# Patient Record
Sex: FEMALE | URBAN - METROPOLITAN AREA
[De-identification: names, ages, dates, MRNs, and addresses within clinical notes are randomized per-mention and may not be internally consistent; named-entity substitution may affect disease eponyms.]

---

## 2020-09-09 ENCOUNTER — APPOINTMENT (RX ONLY)
Dept: URBAN - METROPOLITAN AREA CLINIC 141 | Facility: CLINIC | Age: 55
Setting detail: DERMATOLOGY
End: 2020-09-09

## 2020-09-09 DIAGNOSIS — Z41.9 ENCOUNTER FOR PROCEDURE FOR PURPOSES OTHER THAN REMEDYING HEALTH STATE, UNSPECIFIED: ICD-10-CM

## 2020-09-09 PROCEDURE — ? MEDICAL CONSULTATION: PRODUCTS

## 2020-09-09 ASSESSMENT — LOCATION SIMPLE DESCRIPTION DERM: LOCATION SIMPLE: GLABELLA

## 2020-09-09 ASSESSMENT — LOCATION ZONE DERM: LOCATION ZONE: FACE

## 2020-09-09 ASSESSMENT — LOCATION DETAILED DESCRIPTION DERM: LOCATION DETAILED: GLABELLA

## 2020-09-09 NOTE — HPI: OTHER
Condition:: Melasma on face
Please Describe Your Condition:: Patient is interested products to treat melasma.

## 2020-10-12 ENCOUNTER — APPOINTMENT (RX ONLY)
Dept: URBAN - METROPOLITAN AREA CLINIC 141 | Facility: CLINIC | Age: 55
Setting detail: DERMATOLOGY
End: 2020-10-12

## 2020-10-12 DIAGNOSIS — L81.1 CHLOASMA: ICD-10-CM

## 2020-10-12 DIAGNOSIS — L98.8 OTHER SPECIFIED DISORDERS OF THE SKIN AND SUBCUTANEOUS TISSUE: ICD-10-CM

## 2020-10-12 PROCEDURE — ? COUNSELING

## 2020-10-12 PROCEDURE — ? IN-HOUSE DISPENSING PHARMACY

## 2020-10-12 PROCEDURE — ? OTHER

## 2020-10-12 PROCEDURE — 99213 OFFICE O/P EST LOW 20 MIN: CPT

## 2020-10-12 ASSESSMENT — LOCATION SIMPLE DESCRIPTION DERM
LOCATION SIMPLE: LEFT FOREHEAD
LOCATION SIMPLE: GLABELLA
LOCATION SIMPLE: LEFT CHEEK
LOCATION SIMPLE: RIGHT CHEEK
LOCATION SIMPLE: NOSE
LOCATION SIMPLE: RIGHT FOREHEAD

## 2020-10-12 ASSESSMENT — LOCATION DETAILED DESCRIPTION DERM
LOCATION DETAILED: NASAL SUPRATIP
LOCATION DETAILED: LEFT FOREHEAD
LOCATION DETAILED: RIGHT INFERIOR CENTRAL MALAR CHEEK
LOCATION DETAILED: LEFT INFERIOR CENTRAL MALAR CHEEK
LOCATION DETAILED: GLABELLA
LOCATION DETAILED: RIGHT FOREHEAD

## 2020-10-12 ASSESSMENT — LOCATION ZONE DERM
LOCATION ZONE: FACE
LOCATION ZONE: NOSE

## 2020-10-12 NOTE — PROCEDURE: IN-HOUSE DISPENSING PHARMACY
Product 1 Refills: 11
Product 61 Price/Unit (In Dollars): 0
Product 7 Unit Type: mg
Product 4 Application Directions: apply to affected area(s) once to twice daily as needed
Name Of Product 3: Rosacea Gel with Brimonidine (metronidazole/ivermectin/azelaic acid/brimonidine)
Product 3 Application Directions: apply to affected areas every morning
Name Of Product 2: Melasma Emulsion (hydroquinone/tretinoin/kojic acid)
Product 3 Amount/Unit (Numbers Only): 30
Product 1 Unit Type: tube(s)
Product 2 Refills: 2
Product 1 Amount/Unit (Numbers Only): 1
Render Product Pricing In Note: Yes
Name Of Product 5: Rosacea Combo (metronidazole/ivermectin)
Product 2 Application Directions: apply to affected area(s) for 3 months on, 1 month off, repeat as needed
Product 1 Application Directions: apply to lips twice a day as needed
Product 5 Application Directions: apply to affected area(s) daily
Name Of Product 1: Dr Judd Mcdonald
Detail Level: Zone
Name Of Product 4: Tacrolimus 0.1% Ointment

## 2020-10-12 NOTE — PROCEDURE: MIPS QUALITY
Detail Level: Zone
Quality 154 Part B: Falls: Risk Screening (Should Be Reported With Measure 155.): Patient screened for future fall risk; documentation of no falls in the past year or only one fall without injury in the past year
Quality 111:Pneumonia Vaccination Status For Older Adults: Pneumococcal Vaccination Previously Received
Quality 154 Part A: Falls: Risk Assessment (Should Be Reported With Measure 155.): Falls risk assessment completed and documented in the past 12 months.
Quality 155 (Denominator): Falls Plan Of Care: Falls plan of care documented
Quality 431: Preventive Care And Screening: Unhealthy Alcohol Use - Screening: Patient screened for unhealthy alcohol use using a single question and scores 2 or greater episodes per year and brief intervention occurred
Quality 47: Advance Care Plan: Advance Care Planning discussed and documented in the medical record; patient did not wish or was not able to name a surrogate decision maker or provide an advance care plan.
Quality 110: Preventive Care And Screening: Influenza Immunization: Influenza Immunization Administered during Influenza season
Quality 134: Screening For Clinical Depression And Follow-Up Plan: The patient was screened for depression and the screen was negative and no follow up required

## 2020-10-12 NOTE — PROCEDURE: OTHER
Detail Level: Detailed
Other (Free Text): Recommended the following dosing:\\nperiorbital 15 units, glabella 10 units, forehead 10 units\\n\\nPatient will schedule as desired.
Note Text (......Xxx Chief Complaint.): This diagnosis correlates with the

## 2020-11-10 ENCOUNTER — APPOINTMENT (RX ONLY)
Dept: URBAN - METROPOLITAN AREA CLINIC 141 | Facility: CLINIC | Age: 55
Setting detail: DERMATOLOGY
End: 2020-11-10

## 2020-11-10 DIAGNOSIS — L98.8 OTHER SPECIFIED DISORDERS OF THE SKIN AND SUBCUTANEOUS TISSUE: ICD-10-CM

## 2020-11-10 PROCEDURE — ? COUNSELING

## 2020-11-10 PROCEDURE — ? BOTOX (U OR CC)

## 2020-11-10 ASSESSMENT — LOCATION DETAILED DESCRIPTION DERM
LOCATION DETAILED: LEFT SUPERIOR LATERAL MALAR CHEEK
LOCATION DETAILED: RIGHT SUPERIOR CENTRAL MALAR CHEEK
LOCATION DETAILED: SUPERIOR MID FOREHEAD
LOCATION DETAILED: RIGHT INFERIOR TEMPLE
LOCATION DETAILED: RIGHT FOREHEAD
LOCATION DETAILED: RIGHT INFERIOR LATERAL FOREHEAD
LOCATION DETAILED: LEFT INFERIOR LATERAL FOREHEAD
LOCATION DETAILED: LEFT INFERIOR FOREHEAD
LOCATION DETAILED: RIGHT LATERAL EYEBROW
LOCATION DETAILED: LEFT MEDIAL EYEBROW
LOCATION DETAILED: RIGHT INFERIOR FOREHEAD
LOCATION DETAILED: RIGHT MEDIAL EYEBROW
LOCATION DETAILED: INFERIOR MID FOREHEAD
LOCATION DETAILED: LEFT LATERAL EYEBROW
LOCATION DETAILED: LEFT FOREHEAD
LOCATION DETAILED: LEFT SUPERIOR MEDIAL MALAR CHEEK
LOCATION DETAILED: RIGHT SUPERIOR LATERAL MALAR CHEEK
LOCATION DETAILED: LEFT SUPERIOR CENTRAL MALAR CHEEK

## 2020-11-10 ASSESSMENT — LOCATION SIMPLE DESCRIPTION DERM
LOCATION SIMPLE: RIGHT CHEEK
LOCATION SIMPLE: LEFT EYEBROW
LOCATION SIMPLE: LEFT FOREHEAD
LOCATION SIMPLE: RIGHT FOREHEAD
LOCATION SIMPLE: RIGHT EYEBROW
LOCATION SIMPLE: LEFT CHEEK
LOCATION SIMPLE: RIGHT TEMPLE
LOCATION SIMPLE: SUPERIOR FOREHEAD
LOCATION SIMPLE: INFERIOR FOREHEAD

## 2020-11-10 ASSESSMENT — LOCATION ZONE DERM: LOCATION ZONE: FACE

## 2020-11-10 NOTE — PROCEDURE: MIPS QUALITY
Quality 111:Pneumonia Vaccination Status For Older Adults: Pneumococcal Vaccination Previously Received
Quality 155 (Denominator): Falls Plan Of Care: Falls plan of care documented
Detail Level: Zone
Quality 154 Part B: Falls: Risk Screening (Should Be Reported With Measure 155.): Patient screened for future fall risk; documentation of no falls in the past year or only one fall without injury in the past year
Quality 134: Screening For Clinical Depression And Follow-Up Plan: The patient was screened for depression and the screen was negative and no follow up required
Quality 47: Advance Care Plan: Advance Care Planning discussed and documented in the medical record; patient did not wish or was not able to name a surrogate decision maker or provide an advance care plan.
Quality 110: Preventive Care And Screening: Influenza Immunization: Influenza Immunization Administered during Influenza season
Quality 154 Part A: Falls: Risk Assessment (Should Be Reported With Measure 155.): Falls risk assessment completed and documented in the past 12 months.
Quality 431: Preventive Care And Screening: Unhealthy Alcohol Use - Screening: Patient screened for unhealthy alcohol use using a single question and scores 2 or greater episodes per year and brief intervention occurred

## 2020-11-10 NOTE — PROCEDURE: BOTOX (U OR CC)
Forehead Units/Cc: 10
Additional Area 3 Units: 0
Quantity Per Injection Site (Units Or Cc): 1.25 U
Detail Level: Detailed
Dilution (U/0.1 Cc): 5
Reconstitution Date (Optional): 11/09
Consent: Written consent obtained. Risks include but not limited to lid/brow ptosis, bruising, swelling, diplopia, temporary effect, incomplete chemical denervation.
Additional Area 1 Location: upper and lower cutaneous lip along vermillion border
Quantity Per Injection Site (Units Or Cc): 2.5 U
Post-Care Instructions: Patient instructed to not lie down for 4 hours and limit physical activity for 24 hours. Patient instructed not to travel by airplane for 48 hours.
Additional Area 2 Units: 2.5
Including Pricing Information In The Note: No
Glabellar Complex Units: 7.5
Additional Area 2 Location: lateral brows
Periorbital Skin Units/Cc: 15
Expiration Date (Month Year): 06/2023
Lot #: [id number]
Price (Use Numbers Only, No Special Characters Or $): 367.72
Document As Units Or Cc?: units

## 2021-03-24 ENCOUNTER — APPOINTMENT (OUTPATIENT)
Age: 56
Setting detail: DERMATOLOGY
End: 2021-03-24

## 2021-03-24 DIAGNOSIS — L81.1 CHLOASMA: ICD-10-CM

## 2021-03-24 PROCEDURE — OTHER COUNSELING: OTHER

## 2021-03-24 PROCEDURE — OTHER TREATMENT REGIMEN: OTHER

## 2021-03-24 PROCEDURE — OTHER MIPS QUALITY: OTHER

## 2021-03-24 PROCEDURE — 99202 OFFICE O/P NEW SF 15 MIN: CPT

## 2021-03-24 PROCEDURE — OTHER IN-HOUSE DISPENSING PHARMACY: OTHER

## 2021-03-24 ASSESSMENT — LOCATION DETAILED DESCRIPTION DERM
LOCATION DETAILED: LEFT CENTRAL MALAR CHEEK
LOCATION DETAILED: RIGHT CENTRAL MALAR CHEEK
LOCATION DETAILED: RIGHT INFERIOR CENTRAL MALAR CHEEK
LOCATION DETAILED: LEFT INFERIOR CENTRAL MALAR CHEEK

## 2021-03-24 ASSESSMENT — LOCATION ZONE DERM: LOCATION ZONE: FACE

## 2021-03-24 ASSESSMENT — LOCATION SIMPLE DESCRIPTION DERM
LOCATION SIMPLE: RIGHT CHEEK
LOCATION SIMPLE: LEFT CHEEK

## 2021-03-24 NOTE — PROCEDURE: IN-HOUSE DISPENSING PHARMACY
Product 23 Price/Unit (In Dollars): 0
Product 1 Application Directions: Apply to face qhs
Product 71 Unit Type: mg
Product 2 Price/Unit (In Dollars): 60
Product 1 Price/Unit (In Dollars): 40
Product 1 Units Dispensed: 1
Detail Level: Zone
Name Of Product 1: Tretinoin
Name Of Product 2: Hydroquinone 4% emulsion
Render Product Pricing In Note: Yes
Product 1 Refills: 2

## 2021-05-24 ENCOUNTER — APPOINTMENT (RX ONLY)
Dept: URBAN - METROPOLITAN AREA CLINIC 141 | Facility: CLINIC | Age: 56
Setting detail: DERMATOLOGY
End: 2021-05-24

## 2021-05-24 DIAGNOSIS — L81.1 CHLOASMA: ICD-10-CM

## 2021-05-24 DIAGNOSIS — L98.8 OTHER SPECIFIED DISORDERS OF THE SKIN AND SUBCUTANEOUS TISSUE: ICD-10-CM

## 2021-05-24 PROCEDURE — 99212 OFFICE O/P EST SF 10 MIN: CPT

## 2021-05-24 PROCEDURE — ? PRESCRIPTION

## 2021-05-24 PROCEDURE — ? BOTOX (U OR CC)

## 2021-05-24 PROCEDURE — ? COUNSELING

## 2021-05-24 RX ORDER — H-QUINONE/TRETINOIN/HYDROCORT 8 %-0.025%
EMULSION (GRAM) TOPICAL
Qty: 1 | Refills: 6 | Status: ERX | COMMUNITY
Start: 2021-05-24

## 2021-05-24 RX ADMIN — Medication: at 00:00

## 2021-05-24 ASSESSMENT — LOCATION SIMPLE DESCRIPTION DERM
LOCATION SIMPLE: RIGHT CHEEK
LOCATION SIMPLE: INFERIOR FOREHEAD
LOCATION SIMPLE: SUPERIOR FOREHEAD
LOCATION SIMPLE: LEFT CHEEK
LOCATION SIMPLE: RIGHT EYEBROW
LOCATION SIMPLE: RIGHT TEMPLE
LOCATION SIMPLE: RIGHT FOREHEAD
LOCATION SIMPLE: LEFT FOREHEAD
LOCATION SIMPLE: LEFT EYEBROW

## 2021-05-24 ASSESSMENT — LOCATION DETAILED DESCRIPTION DERM
LOCATION DETAILED: RIGHT MEDIAL EYEBROW
LOCATION DETAILED: LEFT SUPERIOR LATERAL MALAR CHEEK
LOCATION DETAILED: RIGHT INFERIOR FOREHEAD
LOCATION DETAILED: RIGHT SUPERIOR LATERAL MALAR CHEEK
LOCATION DETAILED: LEFT SUPERIOR CENTRAL MALAR CHEEK
LOCATION DETAILED: LEFT MEDIAL EYEBROW
LOCATION DETAILED: RIGHT LATERAL EYEBROW
LOCATION DETAILED: LEFT FOREHEAD
LOCATION DETAILED: SUPERIOR MID FOREHEAD
LOCATION DETAILED: LEFT INFERIOR LATERAL FOREHEAD
LOCATION DETAILED: LEFT LATERAL EYEBROW
LOCATION DETAILED: INFERIOR MID FOREHEAD
LOCATION DETAILED: RIGHT SUPERIOR CENTRAL MALAR CHEEK
LOCATION DETAILED: RIGHT INFERIOR TEMPLE
LOCATION DETAILED: LEFT INFERIOR FOREHEAD
LOCATION DETAILED: LEFT SUPERIOR MEDIAL MALAR CHEEK
LOCATION DETAILED: RIGHT FOREHEAD
LOCATION DETAILED: RIGHT INFERIOR LATERAL FOREHEAD

## 2021-05-24 ASSESSMENT — LOCATION ZONE DERM: LOCATION ZONE: FACE

## 2021-05-24 NOTE — PROCEDURE: BOTOX (U OR CC)
Detail Level: Detailed
Anterior Platysmal Bands Units: 0
Quantity Per Injection Site (Units Or Cc): 1.25 U
Lot #: [id number]
Additional Area 1 Location: upper and lower cutaneous lip along vermillion border
Quantity Per Injection Site (Units Or Cc): 2.5 U
Post-Care Instructions: Patient instructed to not lie down for 4 hours and limit physical activity for 24 hours. Patient instructed not to travel by airplane for 48 hours.
Consent: Written consent obtained. Risks include but not limited to lid/brow ptosis, bruising, swelling, diplopia, temporary effect, incomplete chemical denervation.
Including Pricing Information In The Note: No
Price (Use Numbers Only, No Special Characters Or $): 367.13
Reconstitution Date (Optional): 5/24/21
Forehead Units/Cc: 10
Expiration Date (Month Year): 09/2023
Document As Units Or Cc?: units
Dilution (U/0.1 Cc): 5
Additional Area 2 Units: 2.5
Glabellar Complex Units: 7.5
Additional Area 2 Location: lateral brows
Periorbital Skin Units/Cc: 15

## 2021-05-24 NOTE — PROCEDURE: MIPS QUALITY
Quality 110: Preventive Care And Screening: Influenza Immunization: Influenza Immunization Administered during Influenza season
Quality 154 Part B: Falls: Risk Screening (Should Be Reported With Measure 155.): Patient screened for future fall risk; documentation of no falls in the past year or only one fall without injury in the past year
Quality 154 Part A: Falls: Risk Assessment (Should Be Reported With Measure 155.): Falls risk assessment completed and documented in the past 12 months.
Detail Level: Zone
Quality 431: Preventive Care And Screening: Unhealthy Alcohol Use - Screening: Patient screened for unhealthy alcohol use using a single question and scores 2 or greater episodes per year and brief intervention occurred
Quality 111:Pneumonia Vaccination Status For Older Adults: Pneumococcal Vaccination Previously Received
Quality 155 (Denominator): Falls Plan Of Care: Falls plan of care documented
Quality 47: Advance Care Plan: Advance Care Planning discussed and documented in the medical record; patient did not wish or was not able to name a surrogate decision maker or provide an advance care plan.
Quality 134: Screening For Clinical Depression And Follow-Up Plan: The patient was screened for depression and the screen was negative and no follow up required

## 2022-03-29 ENCOUNTER — APPOINTMENT (RX ONLY)
Dept: URBAN - METROPOLITAN AREA CLINIC 141 | Facility: CLINIC | Age: 57
Setting detail: DERMATOLOGY
End: 2022-03-29

## 2022-03-29 DIAGNOSIS — L98.8 OTHER SPECIFIED DISORDERS OF THE SKIN AND SUBCUTANEOUS TISSUE: ICD-10-CM

## 2022-03-29 PROCEDURE — ? BOTOX (U OR CC)

## 2022-03-29 ASSESSMENT — LOCATION DETAILED DESCRIPTION DERM
LOCATION DETAILED: RIGHT LATERAL FOREHEAD
LOCATION DETAILED: LEFT CENTRAL EYEBROW
LOCATION DETAILED: LEFT SUPERIOR FOREHEAD
LOCATION DETAILED: LEFT MEDIAL EYEBROW
LOCATION DETAILED: RIGHT SUPERIOR FOREHEAD
LOCATION DETAILED: LEFT LATERAL FOREHEAD
LOCATION DETAILED: LEFT SUPERIOR CENTRAL MALAR CHEEK
LOCATION DETAILED: RIGHT INFERIOR TEMPLE
LOCATION DETAILED: RIGHT LATERAL EYEBROW
LOCATION DETAILED: RIGHT MEDIAL EYEBROW
LOCATION DETAILED: LEFT LATERAL EYEBROW
LOCATION DETAILED: LEFT LATERAL CANTHUS
LOCATION DETAILED: RIGHT SUPERIOR CENTRAL MALAR CHEEK
LOCATION DETAILED: SUPERIOR MID FOREHEAD
LOCATION DETAILED: RIGHT CENTRAL EYEBROW

## 2022-03-29 ASSESSMENT — LOCATION SIMPLE DESCRIPTION DERM
LOCATION SIMPLE: SUPERIOR FOREHEAD
LOCATION SIMPLE: RIGHT EYEBROW
LOCATION SIMPLE: LEFT FOREHEAD
LOCATION SIMPLE: RIGHT TEMPLE
LOCATION SIMPLE: RIGHT CHEEK
LOCATION SIMPLE: RIGHT FOREHEAD
LOCATION SIMPLE: LEFT EYELID
LOCATION SIMPLE: LEFT EYEBROW
LOCATION SIMPLE: LEFT CHEEK

## 2022-03-29 ASSESSMENT — LOCATION ZONE DERM
LOCATION ZONE: FACE
LOCATION ZONE: EYELID

## 2022-03-29 NOTE — PROCEDURE: BOTOX (U OR CC)
Masseter Units: 0
Expiration Date (Month Year): 05/24
Glabellar Complex Units: 8
Quantity Per Injection Site (Units Or Cc): 2 U
Lot #: [id number]
Periorbital Skin Units/Cc: 16
Document As Units Or Cc?: units
Post-Care Instructions: Patient instructed to not lie down for 4 hours and limit physical activity for 24 hours. Patient instructed not to travel by airplane for 48 hours.
Dilution (U/0.1 Cc): 4
Including Pricing Information In The Note: No
Price (Use Numbers Only, No Special Characters Or $): 432
Detail Level: Detailed
Additional Area 1 Location: upper and lower cutaneous lip along vermillion border
Consent: Written consent obtained. Risks include but not limited to lid/brow ptosis, bruising, swelling, diplopia, temporary effect, incomplete chemical denervation.
Forehead Units/Cc: 10

## 2022-03-29 NOTE — PROCEDURE: MIPS QUALITY
Detail Level: Zone
Quality 431: Preventive Care And Screening: Unhealthy Alcohol Use - Screening: Patient screened for unhealthy alcohol use using a single question and scores 2 or greater episodes per year and brief intervention occurred
Quality 111:Pneumonia Vaccination Status For Older Adults: Pneumococcal Vaccination Previously Received
Quality 47: Advance Care Plan: Advance Care Planning discussed and documented in the medical record; patient did not wish or was not able to name a surrogate decision maker or provide an advance care plan.
Quality 154 Part A: Falls: Risk Assessment (Should Be Reported With Measure 155.): Falls risk assessment completed and documented in the past 12 months.
Quality 110: Preventive Care And Screening: Influenza Immunization: Influenza Immunization Administered during Influenza season
Quality 154 Part B: Falls: Risk Screening (Should Be Reported With Measure 155.): Patient screened for future fall risk; documentation of no falls in the past year or only one fall without injury in the past year
Quality 134: Screening For Clinical Depression And Follow-Up Plan: The patient was screened for depression and the screen was negative and no follow up required
Quality 155 (Denominator): Falls Plan Of Care: Falls plan of care documented

## 2022-05-12 ENCOUNTER — APPOINTMENT (RX ONLY)
Dept: URBAN - METROPOLITAN AREA CLINIC 176 | Facility: CLINIC | Age: 57
Setting detail: DERMATOLOGY
End: 2022-05-12

## 2022-05-12 DIAGNOSIS — L81.1 CHLOASMA: ICD-10-CM

## 2022-05-12 DIAGNOSIS — Z41.9 ENCOUNTER FOR PROCEDURE FOR PURPOSES OTHER THAN REMEDYING HEALTH STATE, UNSPECIFIED: ICD-10-CM

## 2022-05-12 PROCEDURE — ? BOTOX

## 2022-05-12 PROCEDURE — ? COSMETIC CONSULTATION - MELASMA

## 2022-05-12 ASSESSMENT — LOCATION DETAILED DESCRIPTION DERM
LOCATION DETAILED: RIGHT INFERIOR MEDIAL MALAR CHEEK
LOCATION DETAILED: LEFT LOWER CUTANEOUS LIP
LOCATION DETAILED: RIGHT INFERIOR MEDIAL FOREHEAD
LOCATION DETAILED: LEFT CENTRAL MALAR CHEEK
LOCATION DETAILED: RIGHT INFERIOR TEMPLE

## 2022-05-12 ASSESSMENT — LOCATION SIMPLE DESCRIPTION DERM
LOCATION SIMPLE: RIGHT CHEEK
LOCATION SIMPLE: RIGHT FOREHEAD
LOCATION SIMPLE: RIGHT TEMPLE
LOCATION SIMPLE: LEFT CHEEK
LOCATION SIMPLE: LEFT LIP

## 2022-05-12 ASSESSMENT — LOCATION ZONE DERM
LOCATION ZONE: FACE
LOCATION ZONE: LIP
LOCATION ZONE: FACE

## 2022-05-12 NOTE — PROCEDURE: BOTOX
Show Additional Area 1: Yes
Additional Area 6 Units: 0
Expiration Date (Month Year): 12/2023
Consent: Written consent obtained. Risks include but not limited to lid/brow ptosis, bruising, swelling, diplopia, temporary effect, incomplete chemical denervation.
Additional Area 2 Location: [facility]
Show Mentalis Units: No
Additional Area 1 Location: Perioral
Lot #: [id number]
Post-Care Instructions: Patient instructed to not lie down for 4 hours and limit physical activity for 24 hours. Patient instructed not to travel by airplane for 48 hours.
Price (Use Numbers Only, No Special Characters Or $): 120
Detail Level: Detailed
Dilution (U/0.1 Cc): 5
Additional Area 3 Location: Mental
Periorbital Skin Units: 10

## 2022-08-10 ENCOUNTER — APPOINTMENT (RX ONLY)
Dept: URBAN - METROPOLITAN AREA CLINIC 176 | Facility: CLINIC | Age: 57
Setting detail: DERMATOLOGY
End: 2022-08-10

## 2022-08-10 DIAGNOSIS — L81.1 CHLOASMA: ICD-10-CM

## 2022-08-10 PROCEDURE — ? COSMETIC CONSULTATION - MELASMA

## 2022-08-10 ASSESSMENT — LOCATION SIMPLE DESCRIPTION DERM
LOCATION SIMPLE: LEFT CHEEK
LOCATION SIMPLE: RIGHT CHEEK
LOCATION SIMPLE: LEFT FOREHEAD

## 2022-08-10 ASSESSMENT — LOCATION DETAILED DESCRIPTION DERM
LOCATION DETAILED: RIGHT INFERIOR CENTRAL MALAR CHEEK
LOCATION DETAILED: LEFT MEDIAL MALAR CHEEK
LOCATION DETAILED: LEFT INFERIOR MEDIAL FOREHEAD

## 2022-08-10 ASSESSMENT — LOCATION ZONE DERM: LOCATION ZONE: FACE

## 2022-10-31 ENCOUNTER — OFFICE VISIT (OUTPATIENT)
Dept: URBAN - METROPOLITAN AREA CLINIC 44 | Facility: CLINIC | Age: 57
End: 2022-10-31
Payer: COMMERCIAL

## 2022-10-31 DIAGNOSIS — H43.393 OTHER VITREOUS OPACITIES, BILATERAL: ICD-10-CM

## 2022-10-31 DIAGNOSIS — H25.813 COMBINED FORMS OF AGE-RELATED CATARACT, BILATERAL: ICD-10-CM

## 2022-10-31 DIAGNOSIS — D31.32 BENIGN NEOPLASM OF LEFT CHOROID: Primary | ICD-10-CM

## 2022-10-31 DIAGNOSIS — H04.123 TEAR FILM INSUFFICIENCY OF BILATERAL LACRIMAL GLANDS: ICD-10-CM

## 2022-10-31 PROCEDURE — 92004 COMPRE OPH EXAM NEW PT 1/>: CPT | Performed by: OPTOMETRIST

## 2022-10-31 ASSESSMENT — KERATOMETRY
OS: 43.50
OD: 43.63

## 2022-10-31 ASSESSMENT — VISUAL ACUITY
OS: 20/20
OD: 20/25

## 2022-10-31 ASSESSMENT — INTRAOCULAR PRESSURE
OS: 19
OD: 19

## 2022-10-31 NOTE — IMPRESSION/PLAN
Impression: Combined forms of age-related cataract, bilateral: H25.813. OD> OS  Patient asymptomatic and comfortable with current level of vision. Plan: PLAN: Continue to observe condition. RTC 12 months for complete exam complete + OCT (Mac) to reaccessed cataracts. RTC sooner if patient symptoms become worse.

## 2022-10-31 NOTE — IMPRESSION/PLAN
Impression: Benign neoplasm of left choroid: D31.32. Low risk characteristics. Plan: Discussed findings. Observe.  RTC 12 months for complete + OPTOS

## 2022-10-31 NOTE — IMPRESSION/PLAN
Impression: Tear film insufficiency of bilateral lacrimal glands: H04.123. Clinical evaluation shows mild DED signs. Patient reports no or occasional symptoms not interfering with daily activities. Plan: PLAN: Recommend Lipid based tears to be used 4X daily. Observe condition and RTC if symptom's worsen.

## 2022-11-09 ENCOUNTER — APPOINTMENT (RX ONLY)
Dept: URBAN - METROPOLITAN AREA CLINIC 176 | Facility: CLINIC | Age: 57
Setting detail: DERMATOLOGY
End: 2022-11-09

## 2022-11-09 DIAGNOSIS — L81.1 CHLOASMA: ICD-10-CM

## 2022-11-09 PROCEDURE — ? COSMETIC CONSULTATION: SKIN CARE PRODUCTS AND SERVICES

## 2022-11-09 ASSESSMENT — LOCATION SIMPLE DESCRIPTION DERM
LOCATION SIMPLE: RIGHT CHEEK
LOCATION SIMPLE: LEFT CHEEK
LOCATION SIMPLE: CHIN
LOCATION SIMPLE: LEFT FOREHEAD

## 2022-11-09 ASSESSMENT — LOCATION DETAILED DESCRIPTION DERM
LOCATION DETAILED: LEFT MEDIAL MALAR CHEEK
LOCATION DETAILED: LEFT CHIN
LOCATION DETAILED: LEFT MEDIAL FOREHEAD
LOCATION DETAILED: RIGHT CENTRAL MALAR CHEEK

## 2022-11-09 ASSESSMENT — LOCATION ZONE DERM: LOCATION ZONE: FACE

## 2023-04-03 ENCOUNTER — APPOINTMENT (RX ONLY)
Dept: URBAN - METROPOLITAN AREA CLINIC 176 | Facility: CLINIC | Age: 58
Setting detail: DERMATOLOGY
End: 2023-04-03

## 2023-04-03 DIAGNOSIS — Z41.9 ENCOUNTER FOR PROCEDURE FOR PURPOSES OTHER THAN REMEDYING HEALTH STATE, UNSPECIFIED: ICD-10-CM

## 2023-04-03 PROCEDURE — ? COSMETIC CONSULTATION: SKIN CARE PRODUCTS AND SERVICES

## 2023-04-03 ASSESSMENT — LOCATION DETAILED DESCRIPTION DERM
LOCATION DETAILED: RIGHT INFERIOR CENTRAL MALAR CHEEK
LOCATION DETAILED: INFERIOR MID FOREHEAD
LOCATION DETAILED: RIGHT LOWER CUTANEOUS LIP
LOCATION DETAILED: LEFT MEDIAL MALAR CHEEK

## 2023-04-03 ASSESSMENT — LOCATION ZONE DERM
LOCATION ZONE: LIP
LOCATION ZONE: FACE

## 2023-04-03 ASSESSMENT — LOCATION SIMPLE DESCRIPTION DERM
LOCATION SIMPLE: LEFT CHEEK
LOCATION SIMPLE: INFERIOR FOREHEAD
LOCATION SIMPLE: RIGHT CHEEK
LOCATION SIMPLE: RIGHT LIP

## 2023-05-08 ENCOUNTER — APPOINTMENT (RX ONLY)
Dept: URBAN - METROPOLITAN AREA CLINIC 176 | Facility: CLINIC | Age: 58
Setting detail: DERMATOLOGY
End: 2023-05-08

## 2023-05-08 DIAGNOSIS — Z41.9 ENCOUNTER FOR PROCEDURE FOR PURPOSES OTHER THAN REMEDYING HEALTH STATE, UNSPECIFIED: ICD-10-CM

## 2023-05-08 PROCEDURE — ? PRESCRIPTION

## 2023-05-08 PROCEDURE — ? COSMETIC CONSULTATION: FILLERS

## 2023-05-08 PROCEDURE — ? BOTOX

## 2023-05-08 RX ORDER — VALACYCLOVIR HYDROCHLORIDE 1 G/1
TABLET, FILM COATED ORAL
Qty: 10 | Refills: 0 | Status: ERX | COMMUNITY
Start: 2023-05-08

## 2023-05-08 RX ADMIN — VALACYCLOVIR HYDROCHLORIDE 1: 1 TABLET, FILM COATED ORAL at 00:00

## 2023-05-08 NOTE — PROCEDURE: BOTOX
R Brow Units: 0
Dilution (U/0.1 Cc): 5
Detail Level: Detailed
Glabellar Complex Units: 15
Show Mentalis Units: No
Show Glabellar Units: Yes
Additional Area 3 Location: Mental
Expiration Date (Month Year): 10/2025
Additional Area 1 Location: Perioral
Price (Use Numbers Only, No Special Characters Or $): 405
Additional Area 2 Location: [facility]
Incrementing Botox Units: By 0.5 Units
Consent: Written consent obtained. Risks include but not limited to lid/brow ptosis, bruising, swelling, diplopia, temporary effect, incomplete chemical denervation.
Post-Care Instructions: Patient instructed to not lie down for 4 hours and limit physical activity for 24 hours. Patient instructed not to travel by airplane for 48 hours.
Lot #: [id number]

## 2023-05-19 ENCOUNTER — APPOINTMENT (RX ONLY)
Dept: URBAN - METROPOLITAN AREA CLINIC 176 | Facility: CLINIC | Age: 58
Setting detail: DERMATOLOGY
End: 2023-05-19

## 2023-05-19 DIAGNOSIS — Z41.9 ENCOUNTER FOR PROCEDURE FOR PURPOSES OTHER THAN REMEDYING HEALTH STATE, UNSPECIFIED: ICD-10-CM

## 2023-05-19 PROCEDURE — ? VIVACE

## 2023-05-19 ASSESSMENT — LOCATION SIMPLE DESCRIPTION DERM
LOCATION SIMPLE: LEFT CHEEK
LOCATION SIMPLE: RIGHT CHEEK
LOCATION SIMPLE: CHIN
LOCATION SIMPLE: LEFT FOREHEAD

## 2023-05-19 ASSESSMENT — LOCATION DETAILED DESCRIPTION DERM
LOCATION DETAILED: RIGHT INFERIOR CENTRAL MALAR CHEEK
LOCATION DETAILED: RIGHT CHIN
LOCATION DETAILED: LEFT INFERIOR CENTRAL MALAR CHEEK
LOCATION DETAILED: LEFT INFERIOR MEDIAL FOREHEAD

## 2023-05-19 ASSESSMENT — LOCATION ZONE DERM: LOCATION ZONE: FACE

## 2023-05-19 NOTE — PROCEDURE: VIVACE
Location #3: Cheeks
Depth In Mm: 1.4
Location #2: Lip/Chin
Pain Scale: 0
Depth In Mm: 0.1
Consent: Written consent obtained, risks reviewed including but not limited to pain, scarring, infection and incomplete improvement.  Patient understands the procedure is cosmetic in nature and will require out of pocket payment.
Location #1: Forehead/Nose/Orbital
Depth In Mm: 1.8
Price (Use Numbers Only, No Special Characters Or $): 670.00
Detail Level: Zone
Post-Care Instructions: After the procedure, take precautions agains sun exposure. Do not apply sunscreen for 12 hours after the procedure. Do not apply make-up for 12 hours after the procedure. Avoid alcohol based toners for 10-14 days. After 2-3 days patients can return to their regular skin regimen.
Rf (Optional): 4/800
Treatment Number (Optional): 1
Pre-Procedure Text: After consent was obtained the treatment areas were treated using the above parameters.

## 2023-07-07 ENCOUNTER — APPOINTMENT (RX ONLY)
Dept: URBAN - METROPOLITAN AREA CLINIC 176 | Facility: CLINIC | Age: 58
Setting detail: DERMATOLOGY
End: 2023-07-07

## 2023-07-07 DIAGNOSIS — Z41.9 ENCOUNTER FOR PROCEDURE FOR PURPOSES OTHER THAN REMEDYING HEALTH STATE, UNSPECIFIED: ICD-10-CM

## 2023-07-07 PROCEDURE — ? VIVACE

## 2023-07-07 ASSESSMENT — LOCATION DETAILED DESCRIPTION DERM
LOCATION DETAILED: RIGHT INFERIOR MEDIAL FOREHEAD
LOCATION DETAILED: LEFT CENTRAL MALAR CHEEK
LOCATION DETAILED: RIGHT CENTRAL MALAR CHEEK
LOCATION DETAILED: RIGHT CHIN

## 2023-07-07 ASSESSMENT — LOCATION SIMPLE DESCRIPTION DERM
LOCATION SIMPLE: RIGHT CHEEK
LOCATION SIMPLE: CHIN
LOCATION SIMPLE: LEFT CHEEK
LOCATION SIMPLE: RIGHT FOREHEAD

## 2023-07-07 ASSESSMENT — LOCATION ZONE DERM: LOCATION ZONE: FACE

## 2023-07-07 NOTE — HPI: COSMETIC (LASER RESURFACING)
Have You Had Laser Resurfacing Before?: has had previous treatments
When Was Your Last Laser Resurfacing Treatment?: 05/19/23

## 2023-07-07 NOTE — PROCEDURE: VIVACE
Pre-Procedure Text: After consent was obtained the treatment areas were treated using the above parameters.
Pain Scale: 0
Depth In Mm: 1.4
Depth In Mm: 1
Rf (Optional): 4/800
Consent: Written consent obtained, risks reviewed including but not limited to pain, scarring, infection and incomplete improvement.  Patient understands the procedure is cosmetic in nature and will require out of pocket payment.
Price (Use Numbers Only, No Special Characters Or $): 670.00
Post-Care Instructions: After the procedure, take precautions agains sun exposure. Do not apply sunscreen for 12 hours after the procedure. Do not apply make-up for 12 hours after the procedure. Avoid alcohol based toners for 10-14 days. After 2-3 days patients can return to their regular skin regimen.
Treatment Number (Optional): 2
Depth In Mm: 0.1
Location #3: Cheeks
Location #2: Lip/Chin
Detail Level: Zone
Location #1: Forehead/Nose/Orbital

## 2023-08-18 ENCOUNTER — APPOINTMENT (RX ONLY)
Dept: URBAN - METROPOLITAN AREA CLINIC 176 | Facility: CLINIC | Age: 58
Setting detail: DERMATOLOGY
End: 2023-08-18

## 2023-08-18 DIAGNOSIS — Z41.9 ENCOUNTER FOR PROCEDURE FOR PURPOSES OTHER THAN REMEDYING HEALTH STATE, UNSPECIFIED: ICD-10-CM

## 2023-08-18 PROCEDURE — ? VIVACE

## 2023-08-18 ASSESSMENT — LOCATION DETAILED DESCRIPTION DERM
LOCATION DETAILED: RIGHT INFERIOR CENTRAL MALAR CHEEK
LOCATION DETAILED: INFERIOR MID FOREHEAD
LOCATION DETAILED: RIGHT CHIN
LOCATION DETAILED: LEFT MEDIAL MALAR CHEEK

## 2023-08-18 ASSESSMENT — LOCATION SIMPLE DESCRIPTION DERM
LOCATION SIMPLE: CHIN
LOCATION SIMPLE: RIGHT CHEEK
LOCATION SIMPLE: LEFT CHEEK
LOCATION SIMPLE: INFERIOR FOREHEAD

## 2023-08-18 ASSESSMENT — LOCATION ZONE DERM: LOCATION ZONE: FACE

## 2023-08-18 NOTE — PROCEDURE: VIVACE
Rf (Optional): 5/500
Rf (Optional): 4/800
Location #3: Cheeks
Price (Use Numbers Only, No Special Characters Or $): 670.00
Consent: Written consent obtained, risks reviewed including but not limited to pain, scarring, infection and incomplete improvement.  Patient understands the procedure is cosmetic in nature and will require out of pocket payment.
Detail Level: Zone
Depth In Mm: 1.4
Depth In Mm: 2
Post-Care Instructions: After the procedure, take precautions agains sun exposure. Do not apply sunscreen for 12 hours after the procedure. Do not apply make-up for 12 hours after the procedure. Avoid alcohol based toners for 10-14 days. After 2-3 days patients can return to their regular skin regimen.
Treatment Number (Optional): 3
Pain Scale: 0
Location #2: Lip/Chin
Location #1: Forehead/Nose/Orbital
Depth In Mm: 0.1
Depth In Mm: 1
Pre-Procedure Text: After consent was obtained the treatment areas were treated using the above parameters.

## 2023-09-05 ENCOUNTER — APPOINTMENT (RX ONLY)
Dept: URBAN - METROPOLITAN AREA CLINIC 176 | Facility: CLINIC | Age: 58
Setting detail: DERMATOLOGY
End: 2023-09-05

## 2023-09-05 DIAGNOSIS — Z41.9 ENCOUNTER FOR PROCEDURE FOR PURPOSES OTHER THAN REMEDYING HEALTH STATE, UNSPECIFIED: ICD-10-CM

## 2023-09-05 PROCEDURE — ? VOLBELLA INJECTION

## 2023-09-05 PROCEDURE — ? JUVEDERM ULTRA PLUS XC INJECTION

## 2023-09-05 PROCEDURE — ? BOTOX

## 2023-09-05 NOTE — PROCEDURE: BOTOX
R Brow Units: 0
Dilution (U/0.1 Cc): 5
Detail Level: Detailed
Show Mentalis Units: No
Show Glabellar Units: Yes
Additional Area 3 Location: Mental
Expiration Date (Month Year): 10/2025
Additional Area 1 Location: Perioral
Periorbital Skin Units: 15
Price (Use Numbers Only, No Special Characters Or $): 220
Additional Area 2 Location: [facility]
Incrementing Botox Units: By 0.5 Units
Consent: Written consent obtained. Risks include but not limited to lid/brow ptosis, bruising, swelling, diplopia, temporary effect, incomplete chemical denervation.
Post-Care Instructions: Patient instructed to not lie down for 4 hours and limit physical activity for 24 hours. Patient instructed not to travel by airplane for 48 hours.
Lot #: [id number]

## 2023-09-05 NOTE — PROCEDURE: VOLBELLA INJECTION
Map Statment: See Attach Map for Complete Details
Post-Care Instructions: Patient instructed to apply ice to reduce swelling.
Tear Troughs Filler  Volume In Cc: 0
Anesthesia Volume In Cc: 0.5
Detail Level: Detailed
Include Cannula Information In Note?: Yes
Consent: Written consent obtained. Risks include but not limited to bruising, beading, irregular texture, ulceration, infection, allergic reaction, scar formation, incomplete augmentation, temporary nature, procedural pain.
Filler: Juvederm Volbella XC
Include Cannula Length?: 1.5 inch
Expiration Date (Month Year): 09/06/2024
Anesthesia Type: 1% lidocaine with epinephrine
Lot #: [id number]
Number Of Syringes (Required For Inventory): 1
Include Cannula Size?: 25G
Procedural Text: The filler was administered to the treatment areas noted above.
Additional Anesthesia Volume In Cc: 6
Use Map Statement For Sites (Optional): No

## 2023-09-05 NOTE — PROCEDURE: JUVEDERM ULTRA PLUS XC INJECTION
Detail Level: Detailed
Brows Filler  Volume In Cc: 0
Post-Care Instructions: Patient instructed to apply ice to reduce swelling.
Include Cannula Size?: 25G
Lot #: [id number]
Consent: Written consent obtained. Risks include but not limited to bruising, beading, irregular texture, ulceration, infection, allergic reaction, scar formation, incomplete augmentation, temporary nature, procedural pain.
Marionette Lines Filler  Volume In Cc: 1.6
Procedural Text: The filler was administered to the treatment areas noted above.
Filler: Juvederm Ultra Plus XC
Expiration Date (Month Year): 2024-01-30
Map Statment: See Attach Map for Complete Details
Include Cannula Information In Note?: Yes
Include Cannula Length?: 1.5 inch
Price (Use Numbers Only, No Special Characters Or $): 4633
Use Map Statement For Sites (Optional): No
Number Of Syringes (Required For Inventory): 2
Additional Area 1 Volume In Cc: 0.4
Additional Area 1 Location: chin

## 2023-10-11 ENCOUNTER — APPOINTMENT (RX ONLY)
Dept: URBAN - METROPOLITAN AREA CLINIC 176 | Facility: CLINIC | Age: 58
Setting detail: DERMATOLOGY
End: 2023-10-11

## 2023-10-11 DIAGNOSIS — Z41.9 ENCOUNTER FOR PROCEDURE FOR PURPOSES OTHER THAN REMEDYING HEALTH STATE, UNSPECIFIED: ICD-10-CM

## 2023-10-11 PROCEDURE — ? HYDRAFACIAL

## 2023-10-11 ASSESSMENT — LOCATION SIMPLE DESCRIPTION DERM
LOCATION SIMPLE: LEFT FOREHEAD
LOCATION SIMPLE: RIGHT CHEEK
LOCATION SIMPLE: LEFT CHEEK
LOCATION SIMPLE: CHIN

## 2023-10-11 ASSESSMENT — LOCATION DETAILED DESCRIPTION DERM
LOCATION DETAILED: RIGHT INFERIOR CENTRAL MALAR CHEEK
LOCATION DETAILED: RIGHT CHIN
LOCATION DETAILED: LEFT INFERIOR MEDIAL FOREHEAD
LOCATION DETAILED: LEFT CENTRAL MALAR CHEEK

## 2023-10-11 ASSESSMENT — LOCATION ZONE DERM: LOCATION ZONE: FACE

## 2023-10-11 NOTE — PROCEDURE: HYDRAFACIAL
Tip: Hydropeel Tip, Blue
Number Of Passes: 0
Procedure: Extraction
Tip: Hydropeel Tip, Clear
Treatment Number: 1
Solution: GlySal 7.5%
Vacuum Pressure High Setting (Will Not Render If Set To 0): 14
Procedure: Fusion
Price (Use Numbers Only, No Special Characters Or $): 230.00
Procedure: Boost
Procedure: Exfoliation
Tip: Hydropeel Tip, Teal
Solution: Beta-HD
Tip Override
Procedure: Extend and Protect
Solution Override
Procedure: Peel
Indication: anti-aging
Vacuum Pressure High Setting (Will Not Render If Set To 0): 22
Solution: Activ-4
Consent: Written consent obtained, risks reviewed including but not limited to crusting, scabbing, blistering, scarring, darker or lighter pigmentary change, bruising, and/or incomplete response.
Vacuum Pressure High Setting (Will Not Render If Set To 0): 16
Post-Care Instructions: I reviewed with the patient in detail post-care instructions. Patient should stay away from the sun and wear sun protection until treated areas are fully healed.
Vacuum Pressure High Setting (Will Not Render If Set To 0): 14
Location: face

## 2023-10-30 ENCOUNTER — OFFICE VISIT (OUTPATIENT)
Dept: URBAN - METROPOLITAN AREA CLINIC 44 | Facility: CLINIC | Age: 58
End: 2023-10-30
Payer: COMMERCIAL

## 2023-10-30 DIAGNOSIS — H25.813 COMBINED FORMS OF AGE-RELATED CATARACT, BILATERAL: ICD-10-CM

## 2023-10-30 DIAGNOSIS — H40.013 OPEN ANGLE WITH BORDERLINE FINDINGS, LOW RISK, BILATERAL: Primary | ICD-10-CM

## 2023-10-30 DIAGNOSIS — H04.123 TEAR FILM INSUFFICIENCY OF BILATERAL LACRIMAL GLANDS: ICD-10-CM

## 2023-10-30 DIAGNOSIS — D31.32 BENIGN NEOPLASM OF LEFT CHOROID: ICD-10-CM

## 2023-10-30 DIAGNOSIS — H43.393 OTHER VITREOUS OPACITIES, BILATERAL: ICD-10-CM

## 2023-10-30 PROCEDURE — 92014 COMPRE OPH EXAM EST PT 1/>: CPT | Performed by: OPTOMETRIST

## 2023-10-30 PROCEDURE — 92133 CPTRZD OPH DX IMG PST SGM ON: CPT | Performed by: OPTOMETRIST

## 2023-10-30 ASSESSMENT — INTRAOCULAR PRESSURE
OD: 20
OS: 23
OS: 20
OD: 27

## 2023-10-30 ASSESSMENT — KERATOMETRY
OS: 43.25
OD: 43.38

## 2023-10-30 ASSESSMENT — VISUAL ACUITY
OD: 20/30
OS: 20/20

## 2023-11-13 ENCOUNTER — APPOINTMENT (RX ONLY)
Dept: URBAN - METROPOLITAN AREA CLINIC 176 | Facility: CLINIC | Age: 58
Setting detail: DERMATOLOGY
End: 2023-11-13

## 2023-11-13 DIAGNOSIS — Z41.9 ENCOUNTER FOR PROCEDURE FOR PURPOSES OTHER THAN REMEDYING HEALTH STATE, UNSPECIFIED: ICD-10-CM

## 2023-11-13 PROCEDURE — ? BOTOX

## 2023-11-13 PROCEDURE — ? JUVEDERM VOLUX XC INJECTION

## 2023-11-13 NOTE — PROCEDURE: JUVEDERM VOLUX XC INJECTION
Detail Level: Detailed
Additional Area 4 Volume In Cc: 0
Expiration Date (Month Year): 2024-10-04
Map Statment: See Attach Map for Complete Details
Anesthesia Volume In Cc: 0.5
Filler: Juvederm Volux XC
Additional Area 1 Location: prejowl sulcus
Anesthesia Type: 1% lidocaine with epinephrine
Additional Anesthesia Volume In Cc: 6
Procedural Text: The filler was administered to the treatment areas noted above.
Consent: Written consent obtained. Risks include but not limited to bruising, beading, irregular texture, ulceration, infection, allergic reaction, scar formation, incomplete augmentation, temporary nature, procedural pain.
Post-Care Instructions: Patient instructed to apply ice to reduce swelling.
Include Cannula Information In Note?: No
Lot #: [id number]
Number Of Syringes (Required For Inventory): 1

## 2023-11-13 NOTE — PROCEDURE: BOTOX
Additional Area 3 Units: 0
Show Additional Area 1: Yes
Show Mentalis Units: No
Dilution (U/0.1 Cc): 4
Consent: Written consent obtained. Risks include but not limited to lid/brow ptosis, bruising, swelling, diplopia, temporary effect, incomplete chemical denervation.
Detail Level: Detailed
Additional Area 1 Location: Perioral
Forehead Units: 5
Post-Care Instructions: Patient instructed to not lie down for 4 hours and limit physical activity for 24 hours. Patient instructed not to travel by airplane for 48 hours.
Glabellar Complex Units: 10
Additional Area 4 Location: Riri/platysma
Additional Area 4 Units: 20
Lot #: [id number]
Expiration Date (Month Year): 4/2026
Additional Area 3 Location: Mental
Incrementing Botox Units: By 0.5 Units
Price (Use Numbers Only, No Special Characters Or $): 948
Additional Area 2 Location: [facility]
Periorbital Skin Units: 15

## 2023-11-29 ENCOUNTER — APPOINTMENT (RX ONLY)
Dept: URBAN - METROPOLITAN AREA CLINIC 176 | Facility: CLINIC | Age: 58
Setting detail: DERMATOLOGY
End: 2023-11-29

## 2023-11-29 DIAGNOSIS — Z41.9 ENCOUNTER FOR PROCEDURE FOR PURPOSES OTHER THAN REMEDYING HEALTH STATE, UNSPECIFIED: ICD-10-CM

## 2023-11-29 PROCEDURE — ? HYDRAFACIAL

## 2023-11-29 ASSESSMENT — LOCATION ZONE DERM
LOCATION ZONE: LIP
LOCATION ZONE: FACE

## 2023-11-29 ASSESSMENT — LOCATION DETAILED DESCRIPTION DERM
LOCATION DETAILED: RIGHT LOWER CUTANEOUS LIP
LOCATION DETAILED: RIGHT INFERIOR CENTRAL MALAR CHEEK
LOCATION DETAILED: LEFT CENTRAL MALAR CHEEK
LOCATION DETAILED: RIGHT INFERIOR MEDIAL FOREHEAD

## 2023-11-29 ASSESSMENT — LOCATION SIMPLE DESCRIPTION DERM
LOCATION SIMPLE: RIGHT LIP
LOCATION SIMPLE: LEFT CHEEK
LOCATION SIMPLE: RIGHT CHEEK
LOCATION SIMPLE: RIGHT FOREHEAD

## 2023-11-29 NOTE — PROCEDURE: HYDRAFACIAL
Vacuum Pressure High Setting (Will Not Render If Set To 0): 14
Indication: anti-aging
Vacuum Pressure Low Setting (Will Not Render If Set To 0): 0
Procedure: Fusion
Vacuum Pressure High Setting (Will Not Render If Set To 0): 22
Procedure: Exfoliation
Solution Override
Procedure: Extend and Protect
Tip: Hydropeel Tip, Teal
Solution: Activ-4
Vacuum Pressure High Setting (Will Not Render If Set To 0): 16
Location: face
Procedure: Peel
Tip: Hydropeel Tip, Clear
Procedure: Extraction
Consent: Written consent obtained, risks reviewed including but not limited to crusting, scabbing, blistering, scarring, darker or lighter pigmentary change, bruising, and/or incomplete response.
Treatment Number: 1
Procedure: Boost
Solution: GlySal 7.5%
Post-Care Instructions: I reviewed with the patient in detail post-care instructions. Patient should stay away from the sun and wear sun protection until treated areas are fully healed.
Vacuum Pressure High Setting (Will Not Render If Set To 0): 14
Tip: Hydropeel Tip, Blue
Solution: Beta-HD
Price (Use Numbers Only, No Special Characters Or $): 230.00
Tip Override

## 2024-01-22 ENCOUNTER — APPOINTMENT (RX ONLY)
Dept: URBAN - METROPOLITAN AREA CLINIC 176 | Facility: CLINIC | Age: 59
Setting detail: DERMATOLOGY
End: 2024-01-22

## 2024-01-22 DIAGNOSIS — Z41.9 ENCOUNTER FOR PROCEDURE FOR PURPOSES OTHER THAN REMEDYING HEALTH STATE, UNSPECIFIED: ICD-10-CM

## 2024-01-22 PROCEDURE — ? HYDRAFACIAL

## 2024-01-22 ASSESSMENT — LOCATION SIMPLE DESCRIPTION DERM
LOCATION SIMPLE: LEFT CHEEK
LOCATION SIMPLE: CHIN
LOCATION SIMPLE: INFERIOR FOREHEAD
LOCATION SIMPLE: RIGHT CHEEK

## 2024-01-22 ASSESSMENT — LOCATION DETAILED DESCRIPTION DERM
LOCATION DETAILED: LEFT CENTRAL MALAR CHEEK
LOCATION DETAILED: RIGHT CHIN
LOCATION DETAILED: RIGHT CENTRAL MALAR CHEEK
LOCATION DETAILED: INFERIOR MID FOREHEAD

## 2024-01-22 ASSESSMENT — LOCATION ZONE DERM: LOCATION ZONE: FACE

## 2024-01-22 NOTE — PROCEDURE: HYDRAFACIAL
Number Of Passes Step 5: 0
Procedure: Exfoliation
Solution Override
Vacuum Pressure High Setting (Will Not Render If Set To 0): 22
Location: face
Tip Override
Solution: Activ-4
Tip: Hydropeel Tip, Teal
Procedure: Extraction
Consent: Written consent obtained, risks reviewed including but not limited to crusting, scabbing, blistering, scarring, darker or lighter pigmentary change, bruising, and/or incomplete response.
Procedure: Extend and Protect
Post-Care Instructions: I reviewed with the patient in detail post-care instructions. Patient should stay away from the sun and wear sun protection until treated areas are fully healed.
Procedure: Peel
Tip: Hydropeel Tip, Clear
Vacuum Pressure High Setting (Will Not Render If Set To 0): 14
Treatment Number: 1
Vacuum Pressure High Setting (Will Not Render If Set To 0): 16
Price (Use Numbers Only, No Special Characters Or $): 285.00
Tip: Hydropeel Tip, Blue
Procedure: Fusion
Solution: GlySal 7.5%
Indication: anti-aging
Solution: Beta-HD
Procedure: Boost

## 2024-02-13 ENCOUNTER — APPOINTMENT (RX ONLY)
Dept: URBAN - METROPOLITAN AREA CLINIC 176 | Facility: CLINIC | Age: 59
Setting detail: DERMATOLOGY
End: 2024-02-13

## 2024-02-13 DIAGNOSIS — Z41.9 ENCOUNTER FOR PROCEDURE FOR PURPOSES OTHER THAN REMEDYING HEALTH STATE, UNSPECIFIED: ICD-10-CM

## 2024-02-13 PROCEDURE — ? BOTOX

## 2024-02-13 PROCEDURE — ? ADDITIONAL NOTES

## 2024-02-13 NOTE — PROCEDURE: ADDITIONAL NOTES
Additional Notes: Informed patient that she is starting to get volume loss in her cheeks and suggested we should consider it. Patient aware that her right side is worse than her left side. Patient aware of the B2G1 filler promo. \\nReassured patient that we have made some progress but the “jowling” is still present.\\n\\nInformed patient that one syringe for each cheeks and if any extra can plan to treat with the NLF.
Detail Level: Detailed
Render Risk Assessment In Note?: yes

## 2024-02-13 NOTE — PROCEDURE: BOTOX
Additional Area 3 Units: 0
Show Additional Area 1: Yes
Show Mentalis Units: No
Dilution (U/0.1 Cc): 4
Consent: Written consent obtained. Risks include but not limited to lid/brow ptosis, bruising, swelling, diplopia, temporary effect, incomplete chemical denervation.
Detail Level: Detailed
Additional Area 1 Location: Perioral
Forehead Units: 5
Post-Care Instructions: Patient instructed to not lie down for 4 hours and limit physical activity for 24 hours. Patient instructed not to travel by airplane for 48 hours.
Glabellar Complex Units: 15
Additional Area 4 Location: Riri/platysma
Lot #: [id number]
Expiration Date (Month Year): 4/2026
Additional Area 3 Location: Mental
Incrementing Botox Units: By 0.5 Units
Price (Use Numbers Only, No Special Characters Or $): 673
Additional Area 2 Location: [facility]
Periorbital Skin Units: 20

## 2024-04-01 ENCOUNTER — APPOINTMENT (RX ONLY)
Dept: URBAN - METROPOLITAN AREA CLINIC 176 | Facility: CLINIC | Age: 59
Setting detail: DERMATOLOGY
End: 2024-04-01

## 2024-04-01 DIAGNOSIS — Z41.9 ENCOUNTER FOR PROCEDURE FOR PURPOSES OTHER THAN REMEDYING HEALTH STATE, UNSPECIFIED: ICD-10-CM

## 2024-04-01 PROCEDURE — ? HYDRAFACIAL

## 2024-04-01 ASSESSMENT — LOCATION DETAILED DESCRIPTION DERM
LOCATION DETAILED: RIGHT INFERIOR MEDIAL FOREHEAD
LOCATION DETAILED: RIGHT LOWER CUTANEOUS LIP
LOCATION DETAILED: RIGHT INFERIOR CENTRAL MALAR CHEEK
LOCATION DETAILED: LEFT CENTRAL MALAR CHEEK

## 2024-04-01 ASSESSMENT — LOCATION SIMPLE DESCRIPTION DERM
LOCATION SIMPLE: RIGHT CHEEK
LOCATION SIMPLE: RIGHT LIP
LOCATION SIMPLE: RIGHT FOREHEAD
LOCATION SIMPLE: LEFT CHEEK

## 2024-04-01 ASSESSMENT — LOCATION ZONE DERM
LOCATION ZONE: FACE
LOCATION ZONE: LIP

## 2024-04-01 NOTE — PROCEDURE: HYDRAFACIAL
Treatment Number: 1
Vacuum Pressure Low Setting (Will Not Render If Set To 0): 0
Price (Use Numbers Only, No Special Characters Or $): 250.00
Tip: Hydropeel Tip, Teal
Solution Override
Tip: Hydropeel Tip, Blue
Vacuum Pressure High Setting (Will Not Render If Set To 0): 22
Consent: Written consent obtained, risks reviewed including but not limited to crusting, scabbing, blistering, scarring, darker or lighter pigmentary change, bruising, and/or incomplete response.
Vacuum Pressure High Setting (Will Not Render If Set To 0): 16
Procedure: Exfoliation
Indication: anti-aging
Procedure: Extraction
Post-Care Instructions: I reviewed with the patient in detail post-care instructions. Patient should stay away from the sun and wear sun protection until treated areas are fully healed.
Procedure: Peel
Tip: Hydropeel Tip, Clear
Tip Override
Solution: GlySal 7.5%
Solution: Activ-4
Location: face
Solution: Beta-HD
Vacuum Pressure High Setting (Will Not Render If Set To 0): 14
Procedure: Extend and Protect
Procedure: Boost
Procedure: Fusion

## 2024-05-28 ENCOUNTER — APPOINTMENT (RX ONLY)
Dept: URBAN - METROPOLITAN AREA CLINIC 176 | Facility: CLINIC | Age: 59
Setting detail: DERMATOLOGY
End: 2024-05-28

## 2024-05-28 DIAGNOSIS — Z41.9 ENCOUNTER FOR PROCEDURE FOR PURPOSES OTHER THAN REMEDYING HEALTH STATE, UNSPECIFIED: ICD-10-CM

## 2024-05-28 PROCEDURE — ? HYDRAFACIAL

## 2024-05-28 ASSESSMENT — LOCATION ZONE DERM: LOCATION ZONE: FACE

## 2024-05-28 ASSESSMENT — LOCATION SIMPLE DESCRIPTION DERM: LOCATION SIMPLE: SUPERIOR FOREHEAD

## 2024-05-28 ASSESSMENT — LOCATION DETAILED DESCRIPTION DERM: LOCATION DETAILED: SUPERIOR MID FOREHEAD

## 2024-05-28 NOTE — PROCEDURE: HYDRAFACIAL
Price (Use Numbers Only, No Special Characters Or $): 814
Number Of Passes Step 3: 1
Solution Override
Procedure: Extraction
Vacuum Pressure Low Setting (Will Not Render If Set To 0): 12
Tip: Hydropeel Tip, Clear
Procedure: Extend and Protect
Comments: Patient here for HF sign. + dermaplane.  Has facials regularly with Deirdre.  Skin is in very goof health.  Does have Melasma but seems to have it under control.  Did have some sun this past weekend which she said is not normal.  \\n\\nHF sign. + dermaplane:\\nCleansed\\nPrep\\nDermaplane\\nTreat\\nExtarctions\\nTreat\\nIsdin SPF untinted
Indication: anti-aging
Procedure: Boost
Vacuum Pressure High Setting (Will Not Render If Set To 0): 0
Solution: Beta-HD
Number Of Passes Step 1: 2
Consent: Written consent obtained, risks reviewed including but not limited to crusting, scabbing, blistering, scarring, darker or lighter pigmentary change, bruising, and/or incomplete response.
Tip: Hydropeel Tip, Teal
Post-Care Instructions: I reviewed with the patient in detail post-care instructions. Patient should stay away from the sun and wear sun protection until treated areas are fully healed.
Procedure: Exfoliation
Procedure: Peel
Procedure: Fusion
Location: face
Tip: Hydropeel Tip, Blue
Vacuum Pressure High Setting (Will Not Render If Set To 0): 14
Tip Override
Solution: Activ-4

## 2024-07-09 ENCOUNTER — OFFICE VISIT (OUTPATIENT)
Dept: URBAN - METROPOLITAN AREA CLINIC 44 | Facility: CLINIC | Age: 59
End: 2024-07-09
Payer: COMMERCIAL

## 2024-07-09 DIAGNOSIS — D31.32 BENIGN NEOPLASM OF LEFT CHOROID: Primary | ICD-10-CM

## 2024-07-09 DIAGNOSIS — H25.813 COMBINED FORMS OF AGE-RELATED CATARACT, BILATERAL: ICD-10-CM

## 2024-07-09 DIAGNOSIS — H40.013 OPEN ANGLE WITH BORDERLINE FINDINGS, LOW RISK, BILATERAL: ICD-10-CM

## 2024-07-09 DIAGNOSIS — H04.123 TEAR FILM INSUFFICIENCY OF BILATERAL LACRIMAL GLANDS: ICD-10-CM

## 2024-07-09 PROCEDURE — 92014 COMPRE OPH EXAM EST PT 1/>: CPT | Performed by: OPTOMETRIST

## 2024-07-09 PROCEDURE — 92133 CPTRZD OPH DX IMG PST SGM ON: CPT | Performed by: OPTOMETRIST

## 2024-07-09 ASSESSMENT — VISUAL ACUITY
OS: 20/20
OD: 20/25

## 2024-07-09 ASSESSMENT — INTRAOCULAR PRESSURE
OD: 20
OS: 20

## 2024-07-09 ASSESSMENT — KERATOMETRY
OS: 43.38
OD: 43.63

## 2024-08-06 ENCOUNTER — APPOINTMENT (RX ONLY)
Dept: URBAN - METROPOLITAN AREA CLINIC 176 | Facility: CLINIC | Age: 59
Setting detail: DERMATOLOGY
End: 2024-08-06

## 2024-08-06 DIAGNOSIS — Z41.9 ENCOUNTER FOR PROCEDURE FOR PURPOSES OTHER THAN REMEDYING HEALTH STATE, UNSPECIFIED: ICD-10-CM

## 2024-08-06 PROCEDURE — ? BOTOX

## 2024-08-06 NOTE — PROCEDURE: BOTOX
Additional Area 3 Units: 0
Show Additional Area 1: Yes
Show Mentalis Units: No
Dilution (U/0.1 Cc): 4
Consent: Written consent obtained. Risks include but not limited to lid/brow ptosis, bruising, swelling, diplopia, temporary effect, incomplete chemical denervation.
Detail Level: Detailed
Additional Area 1 Location: Perioral
Forehead Units: 5
Post-Care Instructions: Patient instructed to not lie down for 4 hours and limit physical activity for 24 hours. Patient instructed not to travel by airplane for 48 hours.
Glabellar Complex Units: 15
Additional Area 4 Location: Riri/platysma
Lot #: [id number]
Expiration Date (Month Year): 5/2026
Additional Area 3 Location: Mental
Incrementing Botox Units: By 0.5 Units
Price (Use Numbers Only, No Special Characters Or $): 024
Additional Area 2 Location: [facility]
Periorbital Skin Units: 20

## 2024-09-12 ENCOUNTER — APPOINTMENT (RX ONLY)
Dept: URBAN - METROPOLITAN AREA CLINIC 176 | Facility: CLINIC | Age: 59
Setting detail: DERMATOLOGY
End: 2024-09-12

## 2024-09-12 DIAGNOSIS — Z41.9 ENCOUNTER FOR PROCEDURE FOR PURPOSES OTHER THAN REMEDYING HEALTH STATE, UNSPECIFIED: ICD-10-CM

## 2024-09-12 PROCEDURE — ? HYDRAFACIAL

## 2024-09-12 ASSESSMENT — LOCATION SIMPLE DESCRIPTION DERM
LOCATION SIMPLE: CHIN
LOCATION SIMPLE: LEFT CHEEK
LOCATION SIMPLE: RIGHT CHEEK
LOCATION SIMPLE: LEFT FOREHEAD

## 2024-09-12 ASSESSMENT — LOCATION DETAILED DESCRIPTION DERM
LOCATION DETAILED: RIGHT CHIN
LOCATION DETAILED: LEFT MEDIAL MALAR CHEEK
LOCATION DETAILED: RIGHT INFERIOR CENTRAL MALAR CHEEK
LOCATION DETAILED: LEFT INFERIOR MEDIAL FOREHEAD

## 2024-09-12 ASSESSMENT — LOCATION ZONE DERM: LOCATION ZONE: FACE

## 2024-09-12 NOTE — PROCEDURE: HYDRAFACIAL
Vacuum Pressure High Setting (Will Not Render If Set To 0): 16
Tip: Hydropeel Tip, Blue
Vacuum Pressure Low Setting (Will Not Render If Set To 0): 0
Procedure: Boost
Consent: Written consent obtained, risks reviewed including but not limited to crusting, scabbing, blistering, scarring, darker or lighter pigmentary change, bruising, and/or incomplete response.
Vacuum Pressure High Setting (Will Not Render If Set To 0): 14
Solution: Beta-HD
Tip: Hydropeel Tip, Clear
Location: face
Post-Care Instructions: I reviewed with the patient in detail post-care instructions. Patient should stay away from the sun and wear sun protection until treated areas are fully healed.
Procedure: Exfoliation
Tip: Hydropeel Tip, Teal
Procedure: Fusion
Solution Override
Vacuum Pressure High Setting (Will Not Render If Set To 0): 22
Tip Override
Solution: Activ-4
Procedure: Peel
Price (Use Numbers Only, No Special Characters Or $): 245.00
Procedure: Extend and Protect
Procedure: Extraction
Solution: GlySal 7.5%
Indication: anti-aging
Treatment Number: 1

## 2024-10-28 ENCOUNTER — OFFICE VISIT (OUTPATIENT)
Dept: URBAN - METROPOLITAN AREA CLINIC 44 | Facility: CLINIC | Age: 59
End: 2024-10-28
Payer: COMMERCIAL

## 2024-10-28 DIAGNOSIS — H25.813 COMBINED FORMS OF AGE-RELATED CATARACT, BILATERAL: ICD-10-CM

## 2024-10-28 DIAGNOSIS — H04.123 TEAR FILM INSUFFICIENCY OF BILATERAL LACRIMAL GLANDS: ICD-10-CM

## 2024-10-28 DIAGNOSIS — D31.32 BENIGN NEOPLASM OF LEFT CHOROID: Primary | ICD-10-CM

## 2024-10-28 PROCEDURE — 99214 OFFICE O/P EST MOD 30 MIN: CPT | Performed by: OPTOMETRIST

## 2024-10-28 ASSESSMENT — INTRAOCULAR PRESSURE
OD: 17
OS: 17

## 2024-10-28 ASSESSMENT — KERATOMETRY
OS: 43.25
OD: 43.63

## 2024-10-28 ASSESSMENT — VISUAL ACUITY
OS: 20/20
OD: 20/40

## 2024-11-18 ENCOUNTER — TECH ONLY (OUTPATIENT)
Dept: URBAN - METROPOLITAN AREA CLINIC 44 | Facility: CLINIC | Age: 59
End: 2024-11-18
Payer: COMMERCIAL

## 2024-11-18 DIAGNOSIS — Z01.818 ENCOUNTER FOR OTHER PREPROCEDURAL EXAMINATION: Primary | ICD-10-CM

## 2024-11-18 PROCEDURE — 99203 OFFICE O/P NEW LOW 30 MIN: CPT | Performed by: PHYSICIAN ASSISTANT

## 2024-11-18 ASSESSMENT — PACHYMETRY
OD: 25.17
OS: 3.56
OS: 24.82
OD: 3.51

## 2024-11-19 ENCOUNTER — OFFICE VISIT (OUTPATIENT)
Dept: URBAN - METROPOLITAN AREA CLINIC 44 | Facility: CLINIC | Age: 59
End: 2024-11-19
Payer: COMMERCIAL

## 2024-11-19 DIAGNOSIS — H25.813 COMBINED FORMS OF AGE-RELATED CATARACT, BILATERAL: Primary | ICD-10-CM

## 2024-11-19 DIAGNOSIS — H52.223 REGULAR ASTIGMATISM, BILATERAL: ICD-10-CM

## 2024-11-19 PROCEDURE — 99204 OFFICE O/P NEW MOD 45 MIN: CPT | Performed by: OPHTHALMOLOGY

## 2024-12-02 ENCOUNTER — APPOINTMENT (RX ONLY)
Dept: URBAN - METROPOLITAN AREA CLINIC 176 | Facility: CLINIC | Age: 59
Setting detail: DERMATOLOGY
End: 2024-12-02

## 2024-12-02 DIAGNOSIS — Z41.9 ENCOUNTER FOR PROCEDURE FOR PURPOSES OTHER THAN REMEDYING HEALTH STATE, UNSPECIFIED: ICD-10-CM

## 2024-12-02 PROCEDURE — ? HYDRAFACIAL

## 2024-12-02 ASSESSMENT — LOCATION ZONE DERM: LOCATION ZONE: FACE

## 2024-12-02 ASSESSMENT — LOCATION SIMPLE DESCRIPTION DERM: LOCATION SIMPLE: RIGHT CHEEK

## 2024-12-02 ASSESSMENT — LOCATION DETAILED DESCRIPTION DERM: LOCATION DETAILED: RIGHT INFERIOR MEDIAL MALAR CHEEK

## 2024-12-02 NOTE — PROCEDURE: HYDRAFACIAL
Procedure: Extend and Protect
Vacuum Pressure Low Setting (Will Not Render If Set To 0): 0
Tip: Hydropeel Tip, Blue
Procedure: Extraction
Procedure: Exfoliation
Vacuum Pressure High Setting (Will Not Render If Set To 0): 16
Location: face
Solution Override
Vacuum Pressure High Setting (Will Not Render If Set To 0): 14
Tip: Hydropeel Tip, Clear
Solution: Beta-HD
Tip: Hydropeel Tip, Teal
Procedure: Fusion
Procedure: Boost
Solution: Activ-4
Procedure: Peel
Price (Use Numbers Only, No Special Characters Or $): 250.00
Consent: Written consent obtained, risks reviewed including but not limited to crusting, scabbing, blistering, scarring, darker or lighter pigmentary change, bruising, and/or incomplete response.
Vacuum Pressure High Setting (Will Not Render If Set To 0): 22
Treatment Number: 1
Tip Override
Post-Care Instructions: I reviewed with the patient in detail post-care instructions. Patient should stay away from the sun and wear sun protection until treated areas are fully healed.
Indication: anti-aging
Solution: GlySal 7.5%

## 2024-12-18 ENCOUNTER — SURGERY (OUTPATIENT)
Dept: URBAN - METROPOLITAN AREA SURGERY 19 | Facility: SURGERY | Age: 59
End: 2024-12-18
Payer: COMMERCIAL

## 2024-12-18 PROCEDURE — 66984 XCAPSL CTRC RMVL W/O ECP: CPT | Performed by: OPHTHALMOLOGY

## 2024-12-18 PROCEDURE — PR1CP PR1CP: CUSTOM | Performed by: OPHTHALMOLOGY

## 2024-12-19 ENCOUNTER — POST-OPERATIVE VISIT (OUTPATIENT)
Dept: URBAN - METROPOLITAN AREA CLINIC 44 | Facility: CLINIC | Age: 59
End: 2024-12-19
Payer: COMMERCIAL

## 2024-12-19 DIAGNOSIS — Z48.810 ENCOUNTER FOR SURGICAL AFTERCARE FOLLOWING SURGERY ON A SENSE ORGAN: Primary | ICD-10-CM

## 2024-12-19 PROCEDURE — 99024 POSTOP FOLLOW-UP VISIT: CPT | Performed by: OPTOMETRIST

## 2024-12-27 ENCOUNTER — POST-OPERATIVE VISIT (OUTPATIENT)
Dept: URBAN - METROPOLITAN AREA CLINIC 44 | Facility: CLINIC | Age: 59
End: 2024-12-27
Payer: COMMERCIAL

## 2024-12-27 DIAGNOSIS — Z48.810 ENCOUNTER FOR SURGICAL AFTERCARE FOLLOWING SURGERY ON A SENSE ORGAN: Primary | ICD-10-CM

## 2024-12-27 PROCEDURE — 99024 POSTOP FOLLOW-UP VISIT: CPT | Performed by: OPTOMETRIST

## 2024-12-27 ASSESSMENT — KERATOMETRY
OS: 43.25
OD: 43.63

## 2024-12-27 ASSESSMENT — INTRAOCULAR PRESSURE
OD: 18
OS: 19

## 2024-12-27 ASSESSMENT — VISUAL ACUITY
OS: 20/20
OD: 20/20

## 2025-01-07 ENCOUNTER — SURGERY (OUTPATIENT)
Dept: URBAN - METROPOLITAN AREA SURGERY 19 | Facility: SURGERY | Age: 60
End: 2025-01-07
Payer: COMMERCIAL

## 2025-01-07 PROCEDURE — PR1CP PR1CP: CUSTOM | Performed by: OPHTHALMOLOGY

## 2025-01-07 PROCEDURE — 66984 XCAPSL CTRC RMVL W/O ECP: CPT | Performed by: OPHTHALMOLOGY

## 2025-01-08 ENCOUNTER — POST-OPERATIVE VISIT (OUTPATIENT)
Dept: URBAN - METROPOLITAN AREA CLINIC 44 | Facility: CLINIC | Age: 60
End: 2025-01-08
Payer: COMMERCIAL

## 2025-01-08 DIAGNOSIS — Z96.1 PRESENCE OF INTRAOCULAR LENS: Primary | ICD-10-CM

## 2025-01-08 PROCEDURE — 99024 POSTOP FOLLOW-UP VISIT: CPT | Performed by: OPTOMETRIST

## 2025-01-08 ASSESSMENT — INTRAOCULAR PRESSURE: OS: 23

## 2025-02-05 ENCOUNTER — POST-OPERATIVE VISIT (OUTPATIENT)
Dept: URBAN - METROPOLITAN AREA CLINIC 44 | Facility: CLINIC | Age: 60
End: 2025-02-05
Payer: COMMERCIAL

## 2025-02-05 DIAGNOSIS — Z96.1 PRESENCE OF INTRAOCULAR LENS: Primary | ICD-10-CM

## 2025-02-05 DIAGNOSIS — H52.4 PRESBYOPIA: ICD-10-CM

## 2025-02-05 PROCEDURE — 99024 POSTOP FOLLOW-UP VISIT: CPT | Performed by: OPTOMETRIST

## 2025-02-05 ASSESSMENT — INTRAOCULAR PRESSURE
OD: 26
OS: 26

## 2025-02-05 ASSESSMENT — VISUAL ACUITY
OD: 20/20
OS: 20/20

## 2025-03-03 ENCOUNTER — APPOINTMENT (OUTPATIENT)
Dept: URBAN - METROPOLITAN AREA CLINIC 176 | Facility: CLINIC | Age: 60
Setting detail: DERMATOLOGY
End: 2025-03-03

## 2025-03-03 DIAGNOSIS — Z41.9 ENCOUNTER FOR PROCEDURE FOR PURPOSES OTHER THAN REMEDYING HEALTH STATE, UNSPECIFIED: ICD-10-CM

## 2025-03-03 PROCEDURE — ? HYDRAFACIAL

## 2025-03-03 ASSESSMENT — LOCATION ZONE DERM: LOCATION ZONE: FACE

## 2025-03-03 ASSESSMENT — LOCATION DETAILED DESCRIPTION DERM: LOCATION DETAILED: RIGHT INFERIOR MEDIAL MALAR CHEEK

## 2025-03-03 ASSESSMENT — LOCATION SIMPLE DESCRIPTION DERM: LOCATION SIMPLE: RIGHT CHEEK

## 2025-03-03 NOTE — PROCEDURE: HYDRAFACIAL
Tip: Hydropeel Tip, Teal
Number Of Passes Step 6: 0
Solution: Beta-HD
Tip: Hydropeel Tip, Clear
Vacuum Pressure High Setting (Will Not Render If Set To 0): 16
Solution: Activ-4
Procedure: Boost
Consent: Written consent obtained, risks reviewed including but not limited to crusting, scabbing, blistering, scarring, darker or lighter pigmentary change, bruising, and/or incomplete response.
Procedure: Peel
Solution Override
Vacuum Pressure High Setting (Will Not Render If Set To 0): 22
Vacuum Pressure High Setting (Will Not Render If Set To 0): 14
Price (Use Numbers Only, No Special Characters Or $): 250.00
Solution: GlySal 7.5%
Procedure: Fusion
Post-Care Instructions: I reviewed with the patient in detail post-care instructions. Patient should stay away from the sun and wear sun protection until treated areas are fully healed.
Treatment Number: 1
Procedure: Extraction
Tip: Hydropeel Tip, Blue
Indication: anti-aging
Procedure: Exfoliation
Tip Override
Location: face
Procedure: Extend and Protect

## 2025-04-22 ENCOUNTER — APPOINTMENT (OUTPATIENT)
Dept: URBAN - METROPOLITAN AREA CLINIC 176 | Facility: CLINIC | Age: 60
Setting detail: DERMATOLOGY
End: 2025-04-22

## 2025-04-22 DIAGNOSIS — Z41.9 ENCOUNTER FOR PROCEDURE FOR PURPOSES OTHER THAN REMEDYING HEALTH STATE, UNSPECIFIED: ICD-10-CM

## 2025-04-22 PROCEDURE — ? BOTOX

## 2025-04-22 NOTE — PROCEDURE: BOTOX
Additional Area 3 Units: 0
Show Additional Area 1: Yes
Show Mentalis Units: No
Dilution (U/0.1 Cc): 4
Consent: Written consent obtained. Risks include but not limited to lid/brow ptosis, bruising, swelling, diplopia, temporary effect, incomplete chemical denervation.
Detail Level: Detailed
Additional Area 1 Location: Perioral
Forehead Units: 5
Post-Care Instructions: Patient instructed to not lie down for 4 hours and limit physical activity for 24 hours. Patient instructed not to travel by airplane for 48 hours.
Glabellar Complex Units: 15
Additional Area 4 Location: Riri/platysma
Lot #: [id number]
Expiration Date (Month Year): 4/2027
Additional Area 3 Location: Mental
Incrementing Botox Units: By 0.5 Units
Price (Use Numbers Only, No Special Characters Or $): 002
Additional Area 2 Location: [facility]
Periorbital Skin Units: 20

## 2025-05-14 ENCOUNTER — APPOINTMENT (OUTPATIENT)
Dept: URBAN - METROPOLITAN AREA CLINIC 176 | Facility: CLINIC | Age: 60
Setting detail: DERMATOLOGY
End: 2025-05-14

## 2025-05-14 VITALS — WEIGHT: 152 LBS | HEIGHT: 61 IN

## 2025-05-14 DIAGNOSIS — L57.8 OTHER SKIN CHANGES DUE TO CHRONIC EXPOSURE TO NONIONIZING RADIATION: ICD-10-CM

## 2025-05-14 DIAGNOSIS — L29.89 OTHER PRURITUS: ICD-10-CM

## 2025-05-14 PROBLEM — L29.9 PRURITUS, UNSPECIFIED: Status: ACTIVE | Noted: 2025-05-14

## 2025-05-14 PROCEDURE — ? PRESCRIPTION MEDICATION MANAGEMENT

## 2025-05-14 PROCEDURE — ? COUNSELING

## 2025-05-14 PROCEDURE — 99203 OFFICE O/P NEW LOW 30 MIN: CPT

## 2025-05-14 PROCEDURE — ? ORDER TESTS

## 2025-05-14 PROCEDURE — ? PRESCRIPTION

## 2025-05-14 RX ORDER — TRIAMCINOLONE ACETONIDE 1 MG/G
CREAM TOPICAL
Qty: 30 | Refills: 0 | Status: ERX | COMMUNITY
Start: 2025-05-14

## 2025-05-14 RX ADMIN — TRIAMCINOLONE ACETONIDE: 1 CREAM TOPICAL at 00:00

## 2025-05-14 ASSESSMENT — LOCATION SIMPLE DESCRIPTION DERM
LOCATION SIMPLE: LEFT FOREARM
LOCATION SIMPLE: RIGHT CHEEK
LOCATION SIMPLE: RIGHT FOREARM
LOCATION SIMPLE: LEFT CHEEK

## 2025-05-14 ASSESSMENT — LOCATION DETAILED DESCRIPTION DERM
LOCATION DETAILED: LEFT DISTAL DORSAL FOREARM
LOCATION DETAILED: LEFT CENTRAL MALAR CHEEK
LOCATION DETAILED: LEFT PROXIMAL RADIAL DORSAL FOREARM
LOCATION DETAILED: RIGHT PROXIMAL DORSAL FOREARM
LOCATION DETAILED: RIGHT PROXIMAL RADIAL DORSAL FOREARM
LOCATION DETAILED: RIGHT CENTRAL MALAR CHEEK
LOCATION DETAILED: RIGHT DISTAL DORSAL FOREARM

## 2025-05-14 ASSESSMENT — LOCATION ZONE DERM
LOCATION ZONE: ARM
LOCATION ZONE: FACE

## 2025-05-14 NOTE — PROCEDURE: PRESCRIPTION MEDICATION MANAGEMENT
Plan: No h/o neck problems. Suggested to f/u with PCP for X-ray of neck to r/o neck problems. If results negative, can consider topical steroids, topical anti itch remedies.\\nPatient did some traveling and with sun exposure, she is itching. If possible sun rash, can consider taking antihistamines.\\nPatient is not outside in the sun unless driving for errands which she could consider driving gloves.\\nCould order an MI to r/o autoimmune conditions.
Detail Level: Zone
Samples Given: Dermeleve
Initiate Treatment: Triamcinolone cream BID x 2 weeks.
Render In Strict Bullet Format?: No

## 2025-05-14 NOTE — PROCEDURE: ORDER TESTS
Expected Date Of Service: 05/14/2025
Lab Facility: [id number]
Performing Laboratory: [id number]
Bill For Surgical Tray: no
Billing Type: Third-Party Bill

## 2025-05-14 NOTE — HPI: RASH
What Type Of Note Output Would You Prefer (Optional)?: Bullet Format
How Severe Is Your Rash?: mild
Is This A New Presentation, Or A Follow-Up?: Rash
Additional History: Soap in shower is oil of Olay liquid, showers daily with hot water, and will use lotions daily

## 2025-06-03 ENCOUNTER — APPOINTMENT (OUTPATIENT)
Dept: URBAN - METROPOLITAN AREA CLINIC 176 | Facility: CLINIC | Age: 60
Setting detail: DERMATOLOGY
End: 2025-06-03

## 2025-06-03 DIAGNOSIS — Z41.9 ENCOUNTER FOR PROCEDURE FOR PURPOSES OTHER THAN REMEDYING HEALTH STATE, UNSPECIFIED: ICD-10-CM

## 2025-06-03 PROCEDURE — ? HYDRAFACIAL

## 2025-06-03 PROCEDURE — ? DERMAPLANE

## 2025-06-03 ASSESSMENT — LOCATION SIMPLE DESCRIPTION DERM: LOCATION SIMPLE: RIGHT CHEEK

## 2025-06-03 ASSESSMENT — LOCATION DETAILED DESCRIPTION DERM
LOCATION DETAILED: RIGHT INFERIOR CENTRAL MALAR CHEEK
LOCATION DETAILED: RIGHT MEDIAL MALAR CHEEK

## 2025-06-03 ASSESSMENT — LOCATION ZONE DERM: LOCATION ZONE: FACE

## 2025-06-03 NOTE — PROCEDURE: HYDRAFACIAL
Solution: GlySal 7.5%
Vacuum Pressure High Setting (Will Not Render If Set To 0): 16
Tip Override
Vacuum Pressure Low Setting (Will Not Render If Set To 0): 14
Treatment Number: 1
Price (Use Numbers Only, No Special Characters Or $): 185.00
Number Of Passes Step 6: 0
Solution: Beta-HD
Indication: anti-aging
Procedure: Extend and Protect
Consent: Written consent obtained, risks reviewed including but not limited to crusting, scabbing, blistering, scarring, darker or lighter pigmentary change, bruising, and/or incomplete response.
Tip: Hydropeel Tip, Blue
Solution: Antiox-6
Post-Care Instructions: I reviewed with the patient in detail post-care instructions. Patient should stay away from the sun and wear sun protection until treated areas are fully healed.
Vacuum Pressure Low Setting (Will Not Render If Set To 0): 20
Procedure: Exfoliation
Number Of Passes Step 1: 2
Procedure: Fusion
Tip: Hydropeel Tip, Teal
Solution Override
Vacuum Pressure High Setting (Will Not Render If Set To 0): 22
Location: face
oriented to person, place, time/situation/alert/awake
Procedure: Peel
Tip: Hydropeel Tip, Clear
Solution: Activ-4
Procedure: Extraction

## 2025-06-03 NOTE — PROCEDURE: DERMAPLANE
Price (Use Numbers Only, No Special Characters Or $): 65.00
Blade: 10 blade scalpel
Detail Level: Zone
Pre-Procedure Text: The patient was placed in a recumbant position on the procedure table.\\nZO hydrating cleanser \\nAlcohol \\nDermaplane
Treatment Area Prep: alcohol
Treatment Areas: face
Post-Care Instructions: I reviewed with the patient in detail post-care instructions.

## 2025-07-10 ENCOUNTER — OFFICE VISIT (OUTPATIENT)
Dept: URBAN - METROPOLITAN AREA CLINIC 44 | Facility: CLINIC | Age: 60
End: 2025-07-10
Payer: COMMERCIAL

## 2025-07-10 DIAGNOSIS — H35.363 DRUSEN (DEGENERATIVE) OF MACULA, BILATERAL: ICD-10-CM

## 2025-07-10 DIAGNOSIS — D31.32 BENIGN NEOPLASM OF LEFT CHOROID: Primary | ICD-10-CM

## 2025-07-10 DIAGNOSIS — H26.493 OTHER SECONDARY CATARACT, BILATERAL: ICD-10-CM

## 2025-07-10 DIAGNOSIS — H40.013 OPEN ANGLE WITH BORDERLINE FINDINGS, LOW RISK, BILATERAL: ICD-10-CM

## 2025-07-10 PROCEDURE — 99214 OFFICE O/P EST MOD 30 MIN: CPT | Performed by: OPTOMETRIST

## 2025-07-10 ASSESSMENT — KERATOMETRY
OS: 43.25
OD: 43.63

## 2025-07-10 ASSESSMENT — INTRAOCULAR PRESSURE
OS: 16
OD: 18

## 2025-08-05 ENCOUNTER — APPOINTMENT (OUTPATIENT)
Dept: URBAN - METROPOLITAN AREA CLINIC 176 | Facility: CLINIC | Age: 60
Setting detail: DERMATOLOGY
End: 2025-08-05

## 2025-08-05 DIAGNOSIS — Z41.9 ENCOUNTER FOR PROCEDURE FOR PURPOSES OTHER THAN REMEDYING HEALTH STATE, UNSPECIFIED: ICD-10-CM

## 2025-08-05 PROCEDURE — ? HYDRAFACIAL

## 2025-08-05 PROCEDURE — ? DERMAPLANE

## 2025-08-05 ASSESSMENT — LOCATION SIMPLE DESCRIPTION DERM: LOCATION SIMPLE: RIGHT CHEEK

## 2025-08-05 ASSESSMENT — LOCATION DETAILED DESCRIPTION DERM
LOCATION DETAILED: RIGHT CENTRAL MALAR CHEEK
LOCATION DETAILED: RIGHT INFERIOR MEDIAL MALAR CHEEK

## 2025-08-05 ASSESSMENT — LOCATION ZONE DERM: LOCATION ZONE: FACE
